# Patient Record
Sex: MALE | Race: WHITE | NOT HISPANIC OR LATINO | Employment: FULL TIME | URBAN - METROPOLITAN AREA
[De-identification: names, ages, dates, MRNs, and addresses within clinical notes are randomized per-mention and may not be internally consistent; named-entity substitution may affect disease eponyms.]

---

## 2017-07-17 ENCOUNTER — APPOINTMENT (OUTPATIENT)
Dept: GENERAL RADIOLOGY | Facility: HOSPITAL | Age: 27
End: 2017-07-17

## 2017-07-17 ENCOUNTER — HOSPITAL ENCOUNTER (EMERGENCY)
Facility: HOSPITAL | Age: 27
Discharge: HOME | End: 2017-07-18
Attending: EMERGENCY MEDICINE | Admitting: EMERGENCY MEDICINE

## 2017-07-17 DIAGNOSIS — R07.89 ATYPICAL CHEST PAIN: Primary | ICD-10-CM

## 2017-07-17 LAB — TROPONIN T SERPL-MCNC: <0.01 NG/ML (ref 0–0.03)

## 2017-07-17 PROCEDURE — 71020 HC CHEST PA AND LATERAL: CPT

## 2017-07-17 PROCEDURE — 84484 ASSAY OF TROPONIN QUANT: CPT | Performed by: EMERGENCY MEDICINE

## 2017-07-17 PROCEDURE — 36415 COLL VENOUS BLD VENIPUNCTURE: CPT

## 2017-07-17 PROCEDURE — 93010 ELECTROCARDIOGRAM REPORT: CPT | Performed by: INTERNAL MEDICINE

## 2017-07-17 PROCEDURE — 93005 ELECTROCARDIOGRAM TRACING: CPT

## 2017-07-17 PROCEDURE — 99284 EMERGENCY DEPT VISIT MOD MDM: CPT

## 2017-07-17 RX ORDER — PANTOPRAZOLE SODIUM 20 MG/1
20 TABLET, DELAYED RELEASE ORAL 2 TIMES DAILY
Qty: 60 TABLET | Refills: 0 | Status: SHIPPED | OUTPATIENT
Start: 2017-07-17 | End: 2018-10-26

## 2017-07-17 RX ORDER — SUCRALFATE ORAL 1 G/10ML
1 SUSPENSION ORAL 4 TIMES DAILY
Qty: 420 ML | Refills: 0 | Status: SHIPPED | OUTPATIENT
Start: 2017-07-17 | End: 2018-10-26

## 2017-07-18 VITALS
TEMPERATURE: 97.9 F | DIASTOLIC BLOOD PRESSURE: 62 MMHG | BODY MASS INDEX: 29.35 KG/M2 | HEIGHT: 70 IN | OXYGEN SATURATION: 96 % | WEIGHT: 205 LBS | RESPIRATION RATE: 16 BRPM | HEART RATE: 66 BPM | SYSTOLIC BLOOD PRESSURE: 113 MMHG

## 2017-07-18 NOTE — ED PROVIDER NOTES
" EMERGENCY DEPARTMENT ENCOUNTER    CHIEF COMPLAINT  Chief Complaint: left-sided chest pain  History given by: pt  History limited by: nothing  Room Number: 24/24  PMD: No Known Provider      HPI:  Pt is a 26 y.o. male who presents complaining of intermittent left-sided chat pain for one week that is currently resolved. He states that there is no family history of cardiac trouble. He notes that exertion sometimes exacerbates the pain. According to the pt, the most recent episode started 13 hours ago and lasted for 4 hours.    Duration:  One week  Onset: gradual  Timing: intermittent  Location: left-sided chest  Radiation: none  Quality: \"pain'  Intensity/Severity: moderate  Progression: unchanged  Associated Symptoms: none  Aggravating Factors: exertion  Alleviating Factors: unknown  Previous Episodes: unknown  Treatment before arrival: unknown    PAST MEDICAL HISTORY  Active Ambulatory Problems     Diagnosis Date Noted   • No Active Ambulatory Problems     Resolved Ambulatory Problems     Diagnosis Date Noted   • No Resolved Ambulatory Problems     Past Medical History:   Diagnosis Date   • Rhabdomyolysis        PAST SURGICAL HISTORY  Past Surgical History:   Procedure Laterality Date   • DENTAL PROCEDURE         FAMILY HISTORY  History reviewed. No pertinent family history.    SOCIAL HISTORY  Social History     Social History   • Marital status: Single     Spouse name: N/A   • Number of children: N/A   • Years of education: N/A     Occupational History   • Not on file.     Social History Main Topics   • Smoking status: Current Every Day Smoker   • Smokeless tobacco: Not on file   • Alcohol use 12.6 oz/week     21 Cans of beer per week   • Drug use: No   • Sexual activity: Defer     Other Topics Concern   • Not on file     Social History Narrative   • No narrative on file       ALLERGIES  Ceftin [cefuroxime axetil] and Peanut-containing drug products    REVIEW OF SYSTEMS  Review of Systems   Constitutional: Negative " for activity change, appetite change and fever.   HENT: Negative for congestion and sore throat.    Eyes: Negative.    Respiratory: Negative for cough and shortness of breath.    Cardiovascular: Positive for chest pain (left-sided). Negative for leg swelling.   Gastrointestinal: Negative for abdominal pain, diarrhea and vomiting.   Endocrine: Negative.    Genitourinary: Negative for decreased urine volume and dysuria.   Musculoskeletal: Negative for neck pain.   Skin: Negative for rash and wound.   Allergic/Immunologic: Negative.    Neurological: Negative for weakness, numbness and headaches.   Hematological: Negative.    Psychiatric/Behavioral: Negative.    All other systems reviewed and are negative.      PHYSICAL EXAM  ED Triage Vitals   Temp Heart Rate Resp BP SpO2   07/17/17 1604 07/17/17 1604 07/17/17 1605 07/17/17 1608 07/17/17 1604   97.7 °F (36.5 °C) 73 16 151/96 98 %      Temp src Heart Rate Source Patient Position BP Location FiO2 (%)   07/17/17 1604 07/17/17 1604 07/17/17 1608 07/17/17 1608 --   Tympanic Monitor Sitting Right arm        Physical Exam   Constitutional: He is oriented to person, place, and time and well-developed, well-nourished, and in no distress.   HENT:   Head: Normocephalic and atraumatic.   Eyes: EOM are normal. Pupils are equal, round, and reactive to light.   Neck: Normal range of motion. Neck supple.   Cardiovascular: Normal rate, regular rhythm and normal heart sounds.    Pulmonary/Chest: Effort normal and breath sounds normal. No respiratory distress.   Abdominal: Soft. There is no tenderness. There is no rebound and no guarding.   Musculoskeletal: Normal range of motion. He exhibits no edema.   Neurological: He is alert and oriented to person, place, and time. He has normal sensation and normal strength.   Skin: Skin is warm and dry.   Psychiatric: Mood and affect normal.   Nursing note and vitals reviewed.      LAB RESULTS  Lab Results (last 24 hours)     Procedure Component  Value Units Date/Time    Troponin [[id number]]  (Normal) Collected:  07/17/17 2301    Specimen:  Blood Updated:  07/17/17 2333     Troponin T <0.010 ng/mL     Narrative:       Troponin T Reference Ranges:  Less than 0.03 ng/mL:    Negative for AMI  0.03 to 0.09 ng/mL:      Indeterminant for AMI  Greater than 0.09 ng/mL: Positive for AMI          I ordered the above labs and reviewed the results    RADIOLOGY  XR Chest 2 View   Final Result   No focal pulmonary consolidation. Mild pulmonary   hyperinflation. Follow-up as clinical indications persist.       This report was finalized on 7/17/2017 5:13 PM by Dr. Estrada Grimes MD.               I ordered the above noted radiological studies. Interpreted by radiologist. Reviewed by me in PACS.       PROCEDURES  Procedures    EKG           EKG time: 1720  Rhythm/Rate: SR 58  P waves and AK: normal  QRS, axis: Looks consistent with CHASE   ST and T waves: normal     Interpreted Contemporaneously by me, independently viewed  No comparisons provided      PROGRESS AND CONSULTS  ED Course     1612 - Ordered EKG and CXR for further evaluation.    2345 - Pt rechecked. Pt is resting comfortably. Notified pt of zero Troponin levels. Notified pt of possible acid reflux, for which the pt has an increased risk of due to smoking and alcohol use. Discussed plan to discharge the pt with medication. Pt requested assistance with finding a PCP and health insurance due to recently moving to the area. Pt understands and agrees with plan. All questions addressed.    MEDICAL DECISION MAKING  Results were reviewed/discussed with the patient and they were also made aware of online access. Pt also made aware that some labs, such as cultures, will not be resulted during ER visit and follow up with PMD is necessary.     MDM  Number of Diagnoses or Management Options     Amount and/or Complexity of Data Reviewed  Clinical lab tests: ordered and reviewed (unremarkable)  Tests in the radiology  section of CPT®: ordered and reviewed (CXR - No focal pulmonary consolidation. Mild pulmonary hyperinflation. Follow-up as clinical indications persist.)  Tests in the medicine section of CPT®: ordered and reviewed (See EKG procedure note)           DIAGNOSIS  Final diagnoses:   Atypical chest pain       DISPOSITION  DISCHARGE    Patient discharged in stable condition.    Reviewed implications of results, diagnosis, meds, responsibility to follow up, warning signs and symptoms of possible worsening, potential complications and reasons to return to ER.    Patient/Family voiced understanding of above instructions.    Discussed plan for discharge, as there is no emergent indication for admission.  Pt/family is agreeable and understands need for follow up and repeat testing.  Pt is aware that discharge does not mean that nothing is wrong but it indicates no emergency is present that requires admission and they must continue care with follow-up as given below or physician of their choice.     FOLLOW-UP  [facility] PHYSICAN REFERRAL SERVICE  [facility] [id number]  [phone]             Medication List      New Prescriptions          pantoprazole 20 MG EC tablet   Commonly known as:  PROTONIX   Take 1 tablet by mouth 2 (Two) Times a Day.       sucralfate 1 GM/10ML suspension   Commonly known as:  CARAFATE   Take 10 mL by mouth 4 (Four) Times a Day.               Latest Documented Vital Signs:  As of 11:57 PM  BP- 119/82 HR- 66 Temp- 96.6 °F (35.9 °C) O2 sat- 97%    --  Documentation assistance provided by melia Brody for Dr. Burciaga.  Information recorded by the scribe was done at my direction and has been verified and validated by me.     Abdi Brody  07/17/17 8522       Benito Burciaga MD  07/18/17 1915